# Patient Record
Sex: MALE | URBAN - METROPOLITAN AREA
[De-identification: names, ages, dates, MRNs, and addresses within clinical notes are randomized per-mention and may not be internally consistent; named-entity substitution may affect disease eponyms.]

---

## 2018-11-06 ENCOUNTER — APPOINTMENT (RX ONLY)
Dept: URBAN - METROPOLITAN AREA CLINIC 8 | Facility: CLINIC | Age: 58
Setting detail: DERMATOLOGY
End: 2018-11-06

## 2018-11-06 DIAGNOSIS — Z48.02 ENCOUNTER FOR REMOVAL OF SUTURES: ICD-10-CM

## 2018-11-06 DIAGNOSIS — L91.8 OTHER HYPERTROPHIC DISORDERS OF THE SKIN: ICD-10-CM

## 2018-11-06 PROBLEM — L23.7 ALLERGIC CONTACT DERMATITIS DUE TO PLANTS, EXCEPT FOOD: Status: ACTIVE | Noted: 2018-11-06

## 2018-11-06 PROBLEM — L55.1 SUNBURN OF SECOND DEGREE: Status: ACTIVE | Noted: 2018-11-06

## 2018-11-06 PROBLEM — I10 ESSENTIAL (PRIMARY) HYPERTENSION: Status: ACTIVE | Noted: 2018-11-06

## 2018-11-06 PROBLEM — J30.1 ALLERGIC RHINITIS DUE TO POLLEN: Status: ACTIVE | Noted: 2018-11-06

## 2018-11-06 PROCEDURE — 99024 POSTOP FOLLOW-UP VISIT: CPT

## 2018-11-06 PROCEDURE — 11200 RMVL SKIN TAGS UP TO&INC 15: CPT

## 2018-11-06 PROCEDURE — ? SKIN TAG REMOVAL

## 2018-11-06 PROCEDURE — ? SUTURE REMOVAL (GLOBAL PERIOD)

## 2018-11-06 ASSESSMENT — LOCATION ZONE DERM
LOCATION ZONE: LEG
LOCATION ZONE: ARM

## 2018-11-06 ASSESSMENT — LOCATION DETAILED DESCRIPTION DERM
LOCATION DETAILED: RIGHT ANTERIOR PROXIMAL THIGH
LOCATION DETAILED: RIGHT ANTERIOR PROXIMAL UPPER ARM
LOCATION DETAILED: LEFT ANTERIOR MEDIAL PROXIMAL THIGH

## 2018-11-06 ASSESSMENT — LOCATION SIMPLE DESCRIPTION DERM
LOCATION SIMPLE: LEFT THIGH
LOCATION SIMPLE: RIGHT THIGH
LOCATION SIMPLE: RIGHT UPPER ARM

## 2018-11-06 NOTE — PROCEDURE: SKIN TAG REMOVAL
Anesthesia Type: 1% lidocaine with epinephrine
Add Associated Diagnoses If Applicable When Selecting Medical Necessity: Yes
Detail Level: Zone
Hemostasis: Drysol
Medical Necessity Clause: This procedure was medically necessary because the lesions that were treated were:
Medical Necessity Information: It is in your best interest to select a reason for this procedure from the list below. All of these items fulfill various CMS LCD requirements except the new and changing color options.
Include Z78.9 (Other Specified Conditions Influencing Health Status) As An Associated Diagnosis?: No
Anesthesia Volume In Cc: 3
Consent: Written consent obtained and the risks of skin tag removal was reviewed with the patient including but not limited to bleeding, pigmentary change, infection, pain, and remote possibility of scarring.

## 2018-11-06 NOTE — HPI: SKIN LESION (SKIN TAGS)
How Severe Are They?: mild
Is This A New Presentation, Or A Follow-Up?: Follow Up Skin Tags
Additional History: Would like them removed

## 2018-11-06 NOTE — PROCEDURE: SUTURE REMOVAL (GLOBAL PERIOD)
Detail Level: Detailed
Add 10030 Cpt? (Important Note: In 2017 The Use Of 55730 Is Being Tracked By Cms To Determine Future Global Period Reimbursement For Global Periods): yes

## 2019-10-07 ENCOUNTER — RX ONLY (OUTPATIENT)
Age: 59
Setting detail: RX ONLY
End: 2019-10-07

## 2019-10-07 RX ORDER — KETOCONAZOLE 20 MG/ML
SHAMPOO TOPICAL
Qty: 1 | Refills: 11 | Status: ERX | COMMUNITY
Start: 2019-10-07

## 2020-06-17 ENCOUNTER — APPOINTMENT (RX ONLY)
Dept: URBAN - METROPOLITAN AREA CLINIC 42 | Facility: CLINIC | Age: 60
Setting detail: DERMATOLOGY
End: 2020-06-17

## 2020-06-17 VITALS — TEMPERATURE: 98.7 F

## 2020-06-17 DIAGNOSIS — D18.0 HEMANGIOMA: ICD-10-CM

## 2020-06-17 DIAGNOSIS — L81.4 OTHER MELANIN HYPERPIGMENTATION: ICD-10-CM

## 2020-06-17 DIAGNOSIS — L57.8 OTHER SKIN CHANGES DUE TO CHRONIC EXPOSURE TO NONIONIZING RADIATION: ICD-10-CM | Status: STABLE

## 2020-06-17 DIAGNOSIS — L30.4 ERYTHEMA INTERTRIGO: ICD-10-CM | Status: WELL CONTROLLED

## 2020-06-17 DIAGNOSIS — L82.0 INFLAMED SEBORRHEIC KERATOSIS: ICD-10-CM

## 2020-06-17 DIAGNOSIS — D22 MELANOCYTIC NEVI: ICD-10-CM

## 2020-06-17 PROBLEM — D22.5 MELANOCYTIC NEVI OF TRUNK: Status: ACTIVE | Noted: 2020-06-17

## 2020-06-17 PROBLEM — D18.01 HEMANGIOMA OF SKIN AND SUBCUTANEOUS TISSUE: Status: ACTIVE | Noted: 2020-06-17

## 2020-06-17 PROCEDURE — ? COUNSELING

## 2020-06-17 PROCEDURE — 99213 OFFICE O/P EST LOW 20 MIN: CPT

## 2020-06-17 ASSESSMENT — LOCATION SIMPLE DESCRIPTION DERM
LOCATION SIMPLE: LEFT UPPER BACK
LOCATION SIMPLE: ABDOMEN
LOCATION SIMPLE: INFERIOR FOREHEAD
LOCATION SIMPLE: RIGHT UPPER BACK
LOCATION SIMPLE: LEFT FOREARM

## 2020-06-17 ASSESSMENT — LOCATION DETAILED DESCRIPTION DERM
LOCATION DETAILED: INFERIOR MID FOREHEAD
LOCATION DETAILED: LEFT LATERAL ABDOMEN
LOCATION DETAILED: RIGHT SUPERIOR UPPER BACK
LOCATION DETAILED: LEFT SUPERIOR UPPER BACK
LOCATION DETAILED: LEFT DISTAL DORSAL FOREARM

## 2020-06-17 ASSESSMENT — LOCATION ZONE DERM
LOCATION ZONE: FACE
LOCATION ZONE: TRUNK
LOCATION ZONE: ARM

## 2020-06-17 NOTE — PROCEDURE: MIPS QUALITY
Detail Level: Detailed
Additional Notes: CV-19 SCREENING QUESTIONS\\n\\n1. Have you traveled internationally or on a cruise ship in the last 14 days? No \\n2. Have you traveled out of state within the [place] in the last 14 days? No\\n3. Have you been in contact with anyone who is suspected of having, being tested, or has tested positive for the corona virus? No\\n4. Have you been experiencing fever or respiratory symptoms? No\\n5. Have you been experiencing GI symptoms- diarrhea, vomiting, nausea, or stomach pains in the last week? No

## 2020-06-17 NOTE — PROCEDURE: COUNSELING
Detail Level: Detailed
Patient Specific Counseling (Will Not Stick From Patient To Patient): Groin, behind eras\\n- doing well on meds\\n> Nizoral 2% shampoo vs. Hibiclens wash \\nFU PRN

## 2022-02-01 ENCOUNTER — PREPPED CHART (OUTPATIENT)
Dept: URBAN - METROPOLITAN AREA CLINIC 67 | Facility: CLINIC | Age: 62
End: 2022-02-01

## 2022-02-01 PROBLEM — H35.412 LATTICE DEGENERATION OF RETINA: Noted: 2022-02-01

## 2022-02-01 PROBLEM — H04.123 DRY EYE SYNDROME: Noted: 2022-02-01

## 2022-02-01 PROBLEM — H34.8320 BRANCH RETINAL VEIN OCCLUSION WITH MACULAR EDEMA: Noted: 2022-02-01

## 2022-02-01 PROBLEM — H33.001 RETINAL DETACHMENT: Status: RESOLVED | Noted: 2022-02-01

## 2022-02-01 PROBLEM — H40.023 GLAUCOMA SUSPECT, HIGH RISK: Noted: 2022-02-01

## 2022-02-01 PROBLEM — H34.8322 STABLE BRANCH RETINAL VEIN OCCLUSION: Noted: 2022-02-01

## 2022-02-01 PROBLEM — Z86.69 PERSONAL HISTORY DISORDERS OF NERVOUS SYSTEM AND SENSORY ORGANS: Noted: 2022-02-01

## 2022-04-08 ASSESSMENT — TONOMETRY
OS_IOP_MMHG: 21
OD_IOP_MMHG: 19

## 2022-04-08 ASSESSMENT — VISUAL ACUITY
OS_CC: 20/20
OD_CC: 20/15-1

## 2022-04-12 ENCOUNTER — FOLLOW UP (OUTPATIENT)
Dept: URBAN - METROPOLITAN AREA CLINIC 67 | Facility: CLINIC | Age: 62
End: 2022-04-12

## 2022-04-12 DIAGNOSIS — H34.8320: ICD-10-CM

## 2022-04-12 PROCEDURE — 92134 CPTRZ OPH DX IMG PST SGM RTA: CPT

## 2022-04-12 PROCEDURE — 92202 OPSCPY EXTND ON/MAC DRAW: CPT

## 2022-04-12 PROCEDURE — 92014 COMPRE OPH EXAM EST PT 1/>: CPT

## 2022-04-12 ASSESSMENT — VISUAL ACUITY
OS_SC: 20/20-2
OD_SC: 20/20

## 2022-04-12 ASSESSMENT — TONOMETRY
OS_IOP_MMHG: 18
OD_IOP_MMHG: 19

## 2022-08-03 ENCOUNTER — FOLLOW UP (OUTPATIENT)
Dept: URBAN - METROPOLITAN AREA CLINIC 67 | Facility: CLINIC | Age: 62
End: 2022-08-03

## 2022-08-03 DIAGNOSIS — H35.412: ICD-10-CM

## 2022-08-03 DIAGNOSIS — H34.8322: ICD-10-CM

## 2022-08-03 DIAGNOSIS — H33.001: ICD-10-CM

## 2022-08-03 DIAGNOSIS — H40.023: ICD-10-CM

## 2022-08-03 DIAGNOSIS — H04.123: ICD-10-CM

## 2022-08-03 PROCEDURE — 92014 COMPRE OPH EXAM EST PT 1/>: CPT

## 2022-08-03 PROCEDURE — 92134 CPTRZ OPH DX IMG PST SGM RTA: CPT

## 2022-08-03 PROCEDURE — 92202 OPSCPY EXTND ON/MAC DRAW: CPT

## 2022-08-03 ASSESSMENT — TONOMETRY
OS_IOP_MMHG: 17
OD_IOP_MMHG: 18

## 2022-08-03 ASSESSMENT — VISUAL ACUITY
OS_SC: 20/25+1
OD_SC: 20/20

## 2023-01-03 ENCOUNTER — FOLLOW UP (OUTPATIENT)
Dept: URBAN - METROPOLITAN AREA CLINIC 67 | Facility: CLINIC | Age: 63
End: 2023-01-03

## 2023-01-03 DIAGNOSIS — H35.412: ICD-10-CM

## 2023-01-03 DIAGNOSIS — H34.8322: ICD-10-CM

## 2023-01-03 DIAGNOSIS — H33.001: ICD-10-CM

## 2023-01-03 PROCEDURE — 92202 OPSCPY EXTND ON/MAC DRAW: CPT

## 2023-01-03 PROCEDURE — 92134 CPTRZ OPH DX IMG PST SGM RTA: CPT

## 2023-01-03 PROCEDURE — 92014 COMPRE OPH EXAM EST PT 1/>: CPT

## 2023-01-03 ASSESSMENT — TONOMETRY
OD_IOP_MMHG: 15
OS_IOP_MMHG: 15

## 2023-01-03 ASSESSMENT — VISUAL ACUITY
OD_CC: 20/20
OS_CC: 20/25-1

## 2023-04-25 ENCOUNTER — FOLLOW UP (OUTPATIENT)
Dept: URBAN - METROPOLITAN AREA CLINIC 67 | Facility: CLINIC | Age: 63
End: 2023-04-25

## 2023-04-25 DIAGNOSIS — H35.412: ICD-10-CM

## 2023-04-25 DIAGNOSIS — H35.712: ICD-10-CM

## 2023-04-25 DIAGNOSIS — H33.001: ICD-10-CM

## 2023-04-25 PROCEDURE — 92134 CPTRZ OPH DX IMG PST SGM RTA: CPT | Mod: NC

## 2023-04-25 PROCEDURE — 92201 OPSCPY EXTND RTA DRAW UNI/BI: CPT | Mod: NC

## 2023-04-25 PROCEDURE — 92250 FUNDUS PHOTOGRAPHY W/I&R: CPT

## 2023-04-25 PROCEDURE — 92235 FLUORESCEIN ANGRPH MLTIFRAME: CPT

## 2023-04-25 PROCEDURE — 99214 OFFICE O/P EST MOD 30 MIN: CPT

## 2023-04-25 ASSESSMENT — VISUAL ACUITY
OS_CC: 20/30+1
OD_CC: 20/20

## 2023-04-25 ASSESSMENT — TONOMETRY
OD_IOP_MMHG: 16
OS_IOP_MMHG: 14

## 2023-06-20 ENCOUNTER — FOLLOW UP (OUTPATIENT)
Dept: URBAN - METROPOLITAN AREA CLINIC 67 | Facility: CLINIC | Age: 63
End: 2023-06-20

## 2023-06-20 DIAGNOSIS — H35.412: ICD-10-CM

## 2023-06-20 DIAGNOSIS — H35.712: ICD-10-CM

## 2023-06-20 DIAGNOSIS — H33.001: ICD-10-CM

## 2023-06-20 DIAGNOSIS — H34.8320: ICD-10-CM

## 2023-06-20 PROCEDURE — 92134 CPTRZ OPH DX IMG PST SGM RTA: CPT

## 2023-06-20 PROCEDURE — 92014 COMPRE OPH EXAM EST PT 1/>: CPT

## 2023-06-20 PROCEDURE — 92201 OPSCPY EXTND RTA DRAW UNI/BI: CPT

## 2023-06-20 ASSESSMENT — TONOMETRY
OD_IOP_MMHG: 21
OS_IOP_MMHG: 20

## 2023-06-20 ASSESSMENT — VISUAL ACUITY
OS_CC: 20/30-2
OD_CC: 20/20

## 2023-07-11 ENCOUNTER — INJECTION ONLY (OUTPATIENT)
Dept: URBAN - METROPOLITAN AREA CLINIC 67 | Facility: CLINIC | Age: 63
End: 2023-07-11

## 2023-07-11 DIAGNOSIS — H34.8320: ICD-10-CM

## 2023-07-11 PROCEDURE — 67028 INJECTION EYE DRUG: CPT

## 2023-07-11 PROCEDURE — PF5 LUCENTIS PREFILLED SYRINGE: Mod: JZ

## 2023-07-11 ASSESSMENT — TONOMETRY: OS_IOP_MMHG: 26

## 2023-07-11 ASSESSMENT — VISUAL ACUITY: OS_CC: 20/40+1

## 2023-08-15 ENCOUNTER — FOLLOW UP (OUTPATIENT)
Dept: URBAN - METROPOLITAN AREA CLINIC 67 | Facility: CLINIC | Age: 63
End: 2023-08-15

## 2023-08-15 DIAGNOSIS — H33.001: ICD-10-CM

## 2023-08-15 DIAGNOSIS — H35.712: ICD-10-CM

## 2023-08-15 DIAGNOSIS — H35.412: ICD-10-CM

## 2023-08-15 DIAGNOSIS — H34.8320: ICD-10-CM

## 2023-08-15 PROCEDURE — 67028 INJECTION EYE DRUG: CPT

## 2023-08-15 PROCEDURE — PF5 LUCENTIS PREFILLED SYRINGE: Mod: JZ

## 2023-08-15 PROCEDURE — 92014 COMPRE OPH EXAM EST PT 1/>: CPT | Mod: 25

## 2023-08-15 PROCEDURE — 92134 CPTRZ OPH DX IMG PST SGM RTA: CPT

## 2023-08-15 PROCEDURE — 92202 OPSCPY EXTND ON/MAC DRAW: CPT | Mod: NC

## 2023-08-15 ASSESSMENT — TONOMETRY
OS_IOP_MMHG: 20
OD_IOP_MMHG: 20

## 2023-08-15 ASSESSMENT — VISUAL ACUITY
OS_CC: 20/25
OD_CC: 20/15-1

## 2023-09-12 ENCOUNTER — FOLLOW UP (OUTPATIENT)
Dept: URBAN - METROPOLITAN AREA CLINIC 67 | Facility: CLINIC | Age: 63
End: 2023-09-12

## 2023-09-12 DIAGNOSIS — H35.712: ICD-10-CM

## 2023-09-12 DIAGNOSIS — H34.8320: ICD-10-CM

## 2023-09-12 DIAGNOSIS — H33.001: ICD-10-CM

## 2023-09-12 PROCEDURE — 67028 INJECTION EYE DRUG: CPT

## 2023-09-12 PROCEDURE — 92014 COMPRE OPH EXAM EST PT 1/>: CPT | Mod: 25

## 2023-09-12 PROCEDURE — 5MG INTRAVITREAL CIMERLI 0.5MG: Mod: JZ

## 2023-09-12 PROCEDURE — 92202 OPSCPY EXTND ON/MAC DRAW: CPT | Mod: NC

## 2023-09-12 PROCEDURE — 92134 CPTRZ OPH DX IMG PST SGM RTA: CPT

## 2023-09-12 ASSESSMENT — VISUAL ACUITY
OS_SC: 20/60
OD_SC: 20/20-2
OS_PH: 20/40-1

## 2023-09-12 ASSESSMENT — TONOMETRY
OD_IOP_MMHG: 23
OS_IOP_MMHG: 22

## 2023-10-31 ENCOUNTER — FOLLOW UP (OUTPATIENT)
Dept: URBAN - METROPOLITAN AREA CLINIC 67 | Facility: CLINIC | Age: 63
End: 2023-10-31

## 2023-10-31 DIAGNOSIS — H33.001: ICD-10-CM

## 2023-10-31 DIAGNOSIS — H35.712: ICD-10-CM

## 2023-10-31 DIAGNOSIS — H34.8320: ICD-10-CM

## 2023-10-31 PROCEDURE — 92202 OPSCPY EXTND ON/MAC DRAW: CPT | Mod: NC

## 2023-10-31 PROCEDURE — 5MG INTRAVITREAL CIMERLI 0.5MG: Mod: JZ

## 2023-10-31 PROCEDURE — 92014 COMPRE OPH EXAM EST PT 1/>: CPT | Mod: 25

## 2023-10-31 PROCEDURE — 92134 CPTRZ OPH DX IMG PST SGM RTA: CPT

## 2023-10-31 PROCEDURE — 67028 INJECTION EYE DRUG: CPT

## 2023-10-31 ASSESSMENT — VISUAL ACUITY
OS_CC: 20/30
OD_CC: 20/20

## 2023-10-31 ASSESSMENT — TONOMETRY
OS_IOP_MMHG: 17
OD_IOP_MMHG: 17

## 2023-11-28 ENCOUNTER — FOLLOW UP (OUTPATIENT)
Dept: URBAN - METROPOLITAN AREA CLINIC 67 | Facility: CLINIC | Age: 63
End: 2023-11-28

## 2023-11-28 DIAGNOSIS — H33.001: ICD-10-CM

## 2023-11-28 DIAGNOSIS — H35.712: ICD-10-CM

## 2023-11-28 DIAGNOSIS — H34.8320: ICD-10-CM

## 2023-11-28 PROCEDURE — 92014 COMPRE OPH EXAM EST PT 1/>: CPT | Mod: 25

## 2023-11-28 PROCEDURE — 92134 CPTRZ OPH DX IMG PST SGM RTA: CPT

## 2023-11-28 PROCEDURE — 92202 OPSCPY EXTND ON/MAC DRAW: CPT | Mod: NC

## 2023-11-28 PROCEDURE — 5MG INTRAVITREAL CIMERLI 0.5MG: Mod: JZ

## 2023-11-28 PROCEDURE — 67028 INJECTION EYE DRUG: CPT

## 2023-11-28 ASSESSMENT — VISUAL ACUITY
OS_CC: 20/30+1
OS_PH: 20/25+1
OD_CC: 20/15-2

## 2023-11-28 ASSESSMENT — TONOMETRY
OD_IOP_MMHG: 18
OS_IOP_MMHG: 22

## 2024-01-02 ENCOUNTER — FOLLOW UP (OUTPATIENT)
Dept: URBAN - METROPOLITAN AREA CLINIC 67 | Facility: CLINIC | Age: 64
End: 2024-01-02

## 2024-01-02 DIAGNOSIS — H35.712: ICD-10-CM

## 2024-01-02 DIAGNOSIS — H33.001: ICD-10-CM

## 2024-01-02 DIAGNOSIS — H35.412: ICD-10-CM

## 2024-01-02 DIAGNOSIS — H34.8320: ICD-10-CM

## 2024-01-02 PROCEDURE — 92201 OPSCPY EXTND RTA DRAW UNI/BI: CPT | Mod: 59

## 2024-01-02 PROCEDURE — 5MG INTRAVITREAL CIMERLI 0.5MG: Mod: JZ

## 2024-01-02 PROCEDURE — 92014 COMPRE OPH EXAM EST PT 1/>: CPT | Mod: 25

## 2024-01-02 PROCEDURE — 67028 INJECTION EYE DRUG: CPT

## 2024-01-02 PROCEDURE — 92134 CPTRZ OPH DX IMG PST SGM RTA: CPT

## 2024-01-02 ASSESSMENT — TONOMETRY
OS_IOP_MMHG: 18
OD_IOP_MMHG: 19

## 2024-01-02 ASSESSMENT — VISUAL ACUITY
OS_CC: 20/40-1
OD_CC: 20/20-1

## 2024-01-30 ENCOUNTER — FOLLOW UP (OUTPATIENT)
Dept: URBAN - METROPOLITAN AREA CLINIC 67 | Facility: CLINIC | Age: 64
End: 2024-01-30

## 2024-01-30 DIAGNOSIS — H33.001: ICD-10-CM

## 2024-01-30 DIAGNOSIS — H35.412: ICD-10-CM

## 2024-01-30 DIAGNOSIS — H34.8320: ICD-10-CM

## 2024-01-30 DIAGNOSIS — H35.712: ICD-10-CM

## 2024-01-30 PROCEDURE — 92014 COMPRE OPH EXAM EST PT 1/>: CPT

## 2024-01-30 PROCEDURE — 92202 OPSCPY EXTND ON/MAC DRAW: CPT

## 2024-01-30 PROCEDURE — 92134 CPTRZ OPH DX IMG PST SGM RTA: CPT

## 2024-01-30 ASSESSMENT — TONOMETRY
OD_IOP_MMHG: 21
OS_IOP_MMHG: 24
OS_IOP_MMHG: 25

## 2024-01-30 ASSESSMENT — VISUAL ACUITY
OS_CC: 20/50+
OS_PH: 20/25-1
OD_CC: 20/20

## 2024-03-12 ENCOUNTER — FOLLOW UP (OUTPATIENT)
Dept: URBAN - METROPOLITAN AREA CLINIC 67 | Facility: CLINIC | Age: 64
End: 2024-03-12

## 2024-03-12 DIAGNOSIS — H35.412: ICD-10-CM

## 2024-03-12 DIAGNOSIS — H33.001: ICD-10-CM

## 2024-03-12 DIAGNOSIS — E11.9: ICD-10-CM

## 2024-03-12 DIAGNOSIS — H34.8320: ICD-10-CM

## 2024-03-12 DIAGNOSIS — H35.712: ICD-10-CM

## 2024-03-12 PROCEDURE — 92134 CPTRZ OPH DX IMG PST SGM RTA: CPT

## 2024-03-12 PROCEDURE — 67028 INJECTION EYE DRUG: CPT

## 2024-03-12 PROCEDURE — 92202 OPSCPY EXTND ON/MAC DRAW: CPT | Mod: NC

## 2024-03-12 PROCEDURE — 5MG INTRAVITREAL CIMERLI 0.5MG: Mod: JZ

## 2024-03-12 PROCEDURE — 92014 COMPRE OPH EXAM EST PT 1/>: CPT | Mod: 25

## 2024-03-12 ASSESSMENT — TONOMETRY
OD_IOP_MMHG: 25
OS_IOP_MMHG: 25

## 2024-03-12 ASSESSMENT — VISUAL ACUITY
OD_SC: 20/20
OS_SC: 20/40-2
OS_PH: 20/30+2

## 2024-04-09 ENCOUNTER — FOLLOW UP (OUTPATIENT)
Dept: URBAN - METROPOLITAN AREA CLINIC 67 | Facility: CLINIC | Age: 64
End: 2024-04-09

## 2024-04-09 DIAGNOSIS — E11.9: ICD-10-CM

## 2024-04-09 DIAGNOSIS — H34.8322: ICD-10-CM

## 2024-04-09 DIAGNOSIS — H35.412: ICD-10-CM

## 2024-04-09 DIAGNOSIS — H35.712: ICD-10-CM

## 2024-04-09 DIAGNOSIS — H33.001: ICD-10-CM

## 2024-04-09 PROCEDURE — 92202 OPSCPY EXTND ON/MAC DRAW: CPT

## 2024-04-09 PROCEDURE — 92134 CPTRZ OPH DX IMG PST SGM RTA: CPT

## 2024-04-09 PROCEDURE — 92014 COMPRE OPH EXAM EST PT 1/>: CPT

## 2024-04-09 ASSESSMENT — TONOMETRY
OS_IOP_MMHG: 23
OD_IOP_MMHG: 23

## 2024-04-09 ASSESSMENT — VISUAL ACUITY
OS_SC: 20/60-1
OD_SC: 20/20

## 2024-05-07 ENCOUNTER — FOLLOW UP (OUTPATIENT)
Dept: URBAN - METROPOLITAN AREA CLINIC 67 | Facility: CLINIC | Age: 64
End: 2024-05-07

## 2024-05-07 DIAGNOSIS — H35.412: ICD-10-CM

## 2024-05-07 DIAGNOSIS — H35.712: ICD-10-CM

## 2024-05-07 DIAGNOSIS — H34.8320: ICD-10-CM

## 2024-05-07 DIAGNOSIS — E11.9: ICD-10-CM

## 2024-05-07 DIAGNOSIS — H33.001: ICD-10-CM

## 2024-05-07 PROCEDURE — 92202 OPSCPY EXTND ON/MAC DRAW: CPT | Mod: 59

## 2024-05-07 PROCEDURE — 5MG INTRAVITREAL CIMERLI 0.5MG: Mod: JZ

## 2024-05-07 PROCEDURE — 92134 CPTRZ OPH DX IMG PST SGM RTA: CPT

## 2024-05-07 PROCEDURE — 67028 INJECTION EYE DRUG: CPT

## 2024-05-07 PROCEDURE — 92014 COMPRE OPH EXAM EST PT 1/>: CPT | Mod: 25

## 2024-05-07 ASSESSMENT — TONOMETRY
OD_IOP_MMHG: 22
OS_IOP_MMHG: 23

## 2024-05-07 ASSESSMENT — VISUAL ACUITY
OS_SC: 20/70-2
OD_SC: 20/15-1
OS_PH: 20/30+2

## 2024-06-18 ENCOUNTER — FOLLOW UP (OUTPATIENT)
Dept: URBAN - METROPOLITAN AREA CLINIC 67 | Facility: CLINIC | Age: 64
End: 2024-06-18

## 2024-06-18 DIAGNOSIS — E11.9: ICD-10-CM

## 2024-06-18 DIAGNOSIS — H34.8320: ICD-10-CM

## 2024-06-18 DIAGNOSIS — H33.001: ICD-10-CM

## 2024-06-18 DIAGNOSIS — H35.412: ICD-10-CM

## 2024-06-18 DIAGNOSIS — H35.712: ICD-10-CM

## 2024-06-18 PROCEDURE — 92134 CPTRZ OPH DX IMG PST SGM RTA: CPT

## 2024-06-18 PROCEDURE — 92202 OPSCPY EXTND ON/MAC DRAW: CPT | Mod: 59

## 2024-06-18 PROCEDURE — 92014 COMPRE OPH EXAM EST PT 1/>: CPT | Mod: 25

## 2024-06-18 PROCEDURE — 67028 INJECTION EYE DRUG: CPT

## 2024-06-18 PROCEDURE — 5MG INTRAVITREAL CIMERLI 0.5MG: Mod: JZ

## 2024-06-18 ASSESSMENT — VISUAL ACUITY
OS_SC: 20/40+1
OD_SC: 20/15-3
OS_PH: 20/30

## 2024-06-18 ASSESSMENT — TONOMETRY
OD_IOP_MMHG: 21
OS_IOP_MMHG: 24

## 2024-07-23 ENCOUNTER — FOLLOW UP (OUTPATIENT)
Dept: URBAN - METROPOLITAN AREA CLINIC 67 | Facility: CLINIC | Age: 64
End: 2024-07-23

## 2024-07-23 DIAGNOSIS — H34.8320: ICD-10-CM

## 2024-07-23 DIAGNOSIS — H35.412: ICD-10-CM

## 2024-07-23 DIAGNOSIS — H33.001: ICD-10-CM

## 2024-07-23 DIAGNOSIS — E11.9: ICD-10-CM

## 2024-07-23 DIAGNOSIS — H35.712: ICD-10-CM

## 2024-07-23 PROCEDURE — 92014 COMPRE OPH EXAM EST PT 1/>: CPT

## 2024-07-23 PROCEDURE — 92202 OPSCPY EXTND ON/MAC DRAW: CPT

## 2024-07-23 PROCEDURE — 92134 CPTRZ OPH DX IMG PST SGM RTA: CPT

## 2024-07-23 ASSESSMENT — VISUAL ACUITY
OS_SC: 20/40+2
OS_PH: 20/25-1
OD_SC: 20/15-3

## 2024-07-23 ASSESSMENT — TONOMETRY
OS_IOP_MMHG: 22
OD_IOP_MMHG: 20

## 2024-08-20 ENCOUNTER — FOLLOW UP (OUTPATIENT)
Dept: URBAN - METROPOLITAN AREA CLINIC 67 | Facility: CLINIC | Age: 64
End: 2024-08-20

## 2024-08-20 DIAGNOSIS — H33.001: ICD-10-CM

## 2024-08-20 DIAGNOSIS — H35.412: ICD-10-CM

## 2024-08-20 DIAGNOSIS — H35.712: ICD-10-CM

## 2024-08-20 DIAGNOSIS — H34.8320: ICD-10-CM

## 2024-08-20 DIAGNOSIS — E11.9: ICD-10-CM

## 2024-08-20 PROCEDURE — 92134 CPTRZ OPH DX IMG PST SGM RTA: CPT

## 2024-08-20 PROCEDURE — 92202 OPSCPY EXTND ON/MAC DRAW: CPT

## 2024-08-20 PROCEDURE — 92014 COMPRE OPH EXAM EST PT 1/>: CPT

## 2024-08-20 ASSESSMENT — VISUAL ACUITY
OS_PH: 20/30+1
OD_SC: 20/20
OS_SC: 20/60+2

## 2024-08-20 ASSESSMENT — TONOMETRY
OS_IOP_MMHG: 19
OD_IOP_MMHG: 18

## 2025-07-23 ENCOUNTER — FOLLOW UP (OUTPATIENT)
Dept: URBAN - METROPOLITAN AREA CLINIC 67 | Facility: CLINIC | Age: 65
End: 2025-07-23

## 2025-07-23 DIAGNOSIS — H35.712: ICD-10-CM

## 2025-07-23 DIAGNOSIS — H34.8320: ICD-10-CM

## 2025-07-23 DIAGNOSIS — E11.9: ICD-10-CM

## 2025-07-23 DIAGNOSIS — H35.412: ICD-10-CM

## 2025-07-23 DIAGNOSIS — H33.001: ICD-10-CM

## 2025-07-23 PROCEDURE — 92134 CPTRZ OPH DX IMG PST SGM RTA: CPT

## 2025-07-23 PROCEDURE — 92202 OPSCPY EXTND ON/MAC DRAW: CPT | Mod: 59

## 2025-07-23 PROCEDURE — 92014 COMPRE OPH EXAM EST PT 1/>: CPT | Mod: 25

## 2025-07-23 PROCEDURE — 67028 INJECTION EYE DRUG: CPT

## 2025-07-23 PROCEDURE — PFS EYLEA PFS: Mod: JZ

## 2025-07-23 ASSESSMENT — VISUAL ACUITY
OS_PH: 20/30-2
OS_SC: 20/40-1
OD_SC: 20/20

## 2025-07-23 ASSESSMENT — TONOMETRY
OD_IOP_MMHG: 22
OS_IOP_MMHG: 20

## 2025-08-20 ENCOUNTER — FOLLOW UP (OUTPATIENT)
Dept: URBAN - METROPOLITAN AREA CLINIC 67 | Facility: CLINIC | Age: 65
End: 2025-08-20

## 2025-08-20 DIAGNOSIS — E11.9: ICD-10-CM

## 2025-08-20 DIAGNOSIS — H34.8320: ICD-10-CM

## 2025-08-20 DIAGNOSIS — H35.412: ICD-10-CM

## 2025-08-20 DIAGNOSIS — H35.712: ICD-10-CM

## 2025-08-20 DIAGNOSIS — H33.001: ICD-10-CM

## 2025-08-20 PROCEDURE — 92201 OPSCPY EXTND RTA DRAW UNI/BI: CPT | Mod: 59

## 2025-08-20 PROCEDURE — 92014 COMPRE OPH EXAM EST PT 1/>: CPT | Mod: 25

## 2025-08-20 PROCEDURE — PFS EYLEA PFS: Mod: JZ

## 2025-08-20 PROCEDURE — 67028 INJECTION EYE DRUG: CPT

## 2025-08-20 PROCEDURE — 92134 CPTRZ OPH DX IMG PST SGM RTA: CPT

## 2025-08-20 ASSESSMENT — VISUAL ACUITY
OS_SC: 20/50-1
OS_PH: 20/25-2
OD_SC: 20/15

## 2025-08-20 ASSESSMENT — TONOMETRY
OD_IOP_MMHG: 21
OS_IOP_MMHG: 20